# Patient Record
(demographics unavailable — no encounter records)

---

## 2024-10-08 NOTE — IMAGING
[de-identified] : The patient is a well appearing 12 year old female of their stated age. Patient ambulates with a normal gait.  Negative straight leg raise bilateral   Effected Knee:     ROM:  0-145 degrees Lachman: Negative Pivot Shift: Negative Anterior Drawer: Negative Posterior Drawer / Sag: Negative Varus Stress 0 degrees: Stable Varus Stress 30 degrees: Stable Valgus Stress 0 degrees: Stable Valgus Stress 30 degrees: Stable Medial Pamela: Negative Lateral Pamela: Negative Patella Glide: 2+ Patella Apprehension: Negative Patella Grind: Negative   Palpation: Medial Joint Line: Nontender Lateral Joint Line: Nontender Medial Collateral Ligament: Nontender Lateral Collateral Ligament/PLC: Nontender Distal Femur: Nontender Proximal Tibia: Nontender Tibial Tubercle: Nontender Distal Pole Patella: Nontender Quadriceps Tendon: TENDER &  Intact Patella Tendon: Nontender &  Intact Medial Femoral Condyle: Nontender Medial Distal Hamstring/PES: Nontender Lateral Distal Hamstring: Nontender & Stable Iliotibial Band: Nontender Medial Patellofemoral Ligament: Nontender Adductor: Nontender Proximal GSC-Plantaris: Nontender Calf: Supple & Nontender   Inspection: Deformity: No Erythema: No Ecchymosis: No Abrasions: No Effusion: No Prepatella Bursitis: No Neurologic Exam: Sensation L4-S1: Grossly Intact Motor Exam: Quadriceps: 5 out of 5 Hamstrings: 5 out of 5 EHL: 5 out of 5 FHL: 5 out of 5 TA: 5 out of 5 GS: 5 out of 5 Circulatory/Pulses: Dorsalis Pedis: 2+ Posterior Tibialis: 2+ Additional Pertinent Findings: None     Contralateral Knee:                       ROM: 0-145 degrees Other Pertinent Findings: None   Assessment: The patient is a 12-year-old female with Right knee pain and radiographic and physical exam findings consistent with quadriceps contusion.  The patient's condition is acute Documents/Results Reviewed Today: MRI right knee Tests/Studies Independently Interpreted Today: MRI right knee reveals evidence of quadriceps contusion Pertinent findings include: Tender quadriceps,  Confounding medical conditions/concerns: None     Plan: Patient reports gradual improvement with mechanical symptoms related to quadriceps contusion. Patient will continue physical therapy, HEP, and stretching. Advised patient to obtain Reparel sleeve. Discussed taking OTC anti-inflammatories as needed, discussed with patient and her father the importance of taking Motrin. Modify activity as discussed. The patient will follow up on a PRN basis unless new symptoms arise. Tests Ordered: None Prescription Medications Ordered: Discussed appropriate use of OTC anti-inflammatories and analgesics (including but not limited to Aleve, Advil, Tylenol, Motrin, Ibuprofen, Voltaren gel, etc.) Braces/DME Ordered: Reparel sleeve  Activity/Work/Sports Status: None Additional Instructions: None Follow-Up: PRN  The patient's current medication management of their orthopedic diagnosis was reviewed today: (1) We discussed a comprehensive treatment plan that included possible pharmaceutical management involving the use of prescription strength medications including but not limited to options such as oral Naprosyn 500mg BID, once daily Meloxicam 15 mg, or 500-650 mg Tylenol versus over the counter oral medications and topical prescription NSAID Pennsaid vs over the counter Voltaren gel.  Based on our extensive discussion, the patient declined prescription medication and will use over the counter Advil, Alleve, Voltaren Gel or Tylenol as directed. (2) There is a moderate risk of morbidity with further treatment, especially from use of prescription strength medications and possible side effects of these medications which include upset stomach with oral medications, skin reactions to topical medications and cardiac/renal issues with long term use. (3) I recommended that the patient follow-up with their medical physician to discuss any significant specific potential issues with long term medication use such as interactions with current medications or with exacerbation of underlying medical comorbidities. (4) The benefits and risks associated with use of injectable, oral or topical, prescription and over the counter anti-inflammatory medications were discussed with the patient. The patient voiced understanding of the risks including but not limited to bleeding, stroke, kidney dysfunction, heart disease, and were referred to the black box warning label for further information.  Razia BARRETO attest that this documentation has been prepared under the direction and in the presence of Provider Dr. Ravinder Gerard.  The documentation recorded by the scribe accurately reflects the service MARY LOU Abernathy PA-C personally performed and the decisions made by me. The patient was seen by me under the direct supervision of Dr. Ravinder Gerard

## 2024-10-08 NOTE — IMAGING
[de-identified] : The patient is a well appearing 12 year old female of their stated age. Patient ambulates with a normal gait.  Negative straight leg raise bilateral   Effected Knee:     ROM:  0-145 degrees Lachman: Negative Pivot Shift: Negative Anterior Drawer: Negative Posterior Drawer / Sag: Negative Varus Stress 0 degrees: Stable Varus Stress 30 degrees: Stable Valgus Stress 0 degrees: Stable Valgus Stress 30 degrees: Stable Medial Pamela: Negative Lateral Pamela: Negative Patella Glide: 2+ Patella Apprehension: Negative Patella Grind: Negative   Palpation: Medial Joint Line: Nontender Lateral Joint Line: Nontender Medial Collateral Ligament: Nontender Lateral Collateral Ligament/PLC: Nontender Distal Femur: Nontender Proximal Tibia: Nontender Tibial Tubercle: Nontender Distal Pole Patella: Nontender Quadriceps Tendon: TENDER &  Intact Patella Tendon: Nontender &  Intact Medial Femoral Condyle: Nontender Medial Distal Hamstring/PES: Nontender Lateral Distal Hamstring: Nontender & Stable Iliotibial Band: Nontender Medial Patellofemoral Ligament: Nontender Adductor: Nontender Proximal GSC-Plantaris: Nontender Calf: Supple & Nontender   Inspection: Deformity: No Erythema: No Ecchymosis: No Abrasions: No Effusion: No Prepatella Bursitis: No Neurologic Exam: Sensation L4-S1: Grossly Intact Motor Exam: Quadriceps: 5 out of 5 Hamstrings: 5 out of 5 EHL: 5 out of 5 FHL: 5 out of 5 TA: 5 out of 5 GS: 5 out of 5 Circulatory/Pulses: Dorsalis Pedis: 2+ Posterior Tibialis: 2+ Additional Pertinent Findings: None     Contralateral Knee:                       ROM: 0-145 degrees Other Pertinent Findings: None   Assessment: The patient is a 12-year-old female with Right knee pain and radiographic and physical exam findings consistent with quadriceps contusion.  The patient's condition is acute Documents/Results Reviewed Today: MRI right knee Tests/Studies Independently Interpreted Today: MRI right knee reveals evidence of quadriceps contusion Pertinent findings include: Tender quadriceps,  Confounding medical conditions/concerns: None     Plan: Patient reports gradual improvement with mechanical symptoms related to quadriceps contusion. Patient will continue physical therapy, HEP, and stretching. Advised patient to obtain Reparel sleeve. Discussed taking OTC anti-inflammatories as needed, discussed with patient and her father the importance of taking Motrin. Modify activity as discussed. The patient will follow up on a PRN basis unless new symptoms arise. Tests Ordered: None Prescription Medications Ordered: Discussed appropriate use of OTC anti-inflammatories and analgesics (including but not limited to Aleve, Advil, Tylenol, Motrin, Ibuprofen, Voltaren gel, etc.) Braces/DME Ordered: Reparel sleeve  Activity/Work/Sports Status: None Additional Instructions: None Follow-Up: PRN  The patient's current medication management of their orthopedic diagnosis was reviewed today: (1) We discussed a comprehensive treatment plan that included possible pharmaceutical management involving the use of prescription strength medications including but not limited to options such as oral Naprosyn 500mg BID, once daily Meloxicam 15 mg, or 500-650 mg Tylenol versus over the counter oral medications and topical prescription NSAID Pennsaid vs over the counter Voltaren gel.  Based on our extensive discussion, the patient declined prescription medication and will use over the counter Advil, Alleve, Voltaren Gel or Tylenol as directed. (2) There is a moderate risk of morbidity with further treatment, especially from use of prescription strength medications and possible side effects of these medications which include upset stomach with oral medications, skin reactions to topical medications and cardiac/renal issues with long term use. (3) I recommended that the patient follow-up with their medical physician to discuss any significant specific potential issues with long term medication use such as interactions with current medications or with exacerbation of underlying medical comorbidities. (4) The benefits and risks associated with use of injectable, oral or topical, prescription and over the counter anti-inflammatory medications were discussed with the patient. The patient voiced understanding of the risks including but not limited to bleeding, stroke, kidney dysfunction, heart disease, and were referred to the black box warning label for further information.  Razia BARRETO attest that this documentation has been prepared under the direction and in the presence of Provider Dr. Ravinder Gerrad.  The documentation recorded by the scribe accurately reflects the service MARY LOU Abernathy PA-C personally performed and the decisions made by me. The patient was seen by me under the direct supervision of Dr. Ravinder Gerard

## 2024-10-08 NOTE — HISTORY OF PRESENT ILLNESS
[de-identified] : The patient is a 12 year  old R hand dominant female who presents today complaining of R knee pain.   Date of Injury/Onset: 9/26/24 Pain:    At Rest: 0-1/10  With Activity:  1-2/10  Mechanism of injury: collided with another player while going for the ball playing soccer and kept playing This is NOT a Work Related Injury being treated under Worker's Compensation. This IS an athletic injury occurring associated with an interscholastic or organized sports team. 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 Stopped using the crutches and the playmaker knee brace. Additional Information: None

## 2024-10-08 NOTE — HISTORY OF PRESENT ILLNESS
[de-identified] : The patient is a 12 year  old R hand dominant female who presents today complaining of R knee pain.   Date of Injury/Onset: 9/26/24 Pain:    At Rest: 0-1/10  With Activity:  1-2/10  Mechanism of injury: collided with another player while going for the ball playing soccer and kept playing This is NOT a Work Related Injury being treated under Worker's Compensation. This IS an athletic injury occurring associated with an interscholastic or organized sports team. 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 Stopped using the crutches and the playmaker knee brace. Additional Information: None

## 2025-07-11 NOTE — CONSULT LETTER
[Today's Date] : [unfilled] [Name] : Name: [unfilled] [] : : ~~ [Today's Date:] : [unfilled] [Dear  ___:] : Dear Dr. [unfilled]: [Consult] : I had the pleasure of evaluating your patient, [unfilled]. My full evaluation follows. [Consult - Single Provider] : Thank you very much for allowing me to participate in the care of this patient. If you have any questions, please do not hesitate to contact me. [Sincerely,] : Sincerely, [FreeTextEntry4] : Luis Mcdaniel MD [FreeTextEntry5] : [address] [FreeTextEntry6] : [place] [address] [de-identified] : Gurdeep Ferrell MD, FFAC, FFAP, [facility]    4 = No assist / stand by assistance

## 2025-07-11 NOTE — CARDIOLOGY SUMMARY
[Today's Date] : [unfilled] [FreeTextEntry1] : For Sinus arrhythmia Normal sinus rhythm, normal QRS axis, normal intervals (QTc 428 msec), no hypertrophy, no pre-excitation, no ST segment or T wave abnormalities. Normal EKG for age.   [FreeTextEntry2] : Normal intracardiac anatomy.  LV dimensions and shortening fraction were normal.  No pericardial effusion.

## 2025-07-11 NOTE — DISCUSSION/SUMMARY
[FreeTextEntry1] : In summary, MIRIAN is a 13 year female with arrhythmia noted on physical. I discussed at length with the family that these symptoms are concerning for a possible arrhythmia, and that I would like to investigate further with a Holter monitor (which was placed today). If She feels recurrent symptoms, Her heart rate should be checked. Medical attention should be sought immediately if the palpitations are prolonged, associated with lightheadedness, or if there is loss of consciousness.   She may be feeling sinus arrhythmia. She should maintain good hydration. She should drink at least 8 cups of non-caffeinated beverages per day. Caffeinated beverages should be avoided. Fluid intake should be titrated to keep the urine dilute. Salt intake should be increased, unless She develops hypertension in the future.   Routine pediatric cardiology follow-up is not indicated unless the Holter monitor is abnormal, if there are increased palpitations, syncope or any other cardiac concerns. The family verbalized understanding, and all questions were answered.  [Needs SBE Prophylaxis] : [unfilled] does not need bacterial endocarditis prophylaxis [Influenza vaccine is recommended] : Influenza vaccine is recommended